# Patient Record
Sex: FEMALE | Race: AMERICAN INDIAN OR ALASKA NATIVE
[De-identification: names, ages, dates, MRNs, and addresses within clinical notes are randomized per-mention and may not be internally consistent; named-entity substitution may affect disease eponyms.]

---

## 2018-10-05 ENCOUNTER — HOSPITAL ENCOUNTER (OUTPATIENT)
Dept: HOSPITAL 5 - MRI | Age: 43
Discharge: HOME | End: 2018-10-05
Attending: OTOLARYNGOLOGY
Payer: COMMERCIAL

## 2018-10-05 DIAGNOSIS — G43.009: Primary | ICD-10-CM

## 2018-10-05 PROCEDURE — 70544 MR ANGIOGRAPHY HEAD W/O DYE: CPT

## 2018-10-05 NOTE — MAGNETIC RESONANCE REPORT
MRA HEAD WITHOUT CONTRAST: 10/05/18 06:48:00



CLINICAL: Migraine headache without aura.  No comparisons.



TECHNIQUE: Axial 3-D time-of-flight MR angiography of the [place] 

[place] with review of axial source images.



FINDINGS: Intact [place] with no aneurysm, stenosis or 

occlusion.  Symmetric blood flow in the anterior, middle and posterior 

cerebral arteries.  Normal basilar and vertebral arteries.  The left 

vertebral artery is dominant.  The



IMPRESSION: Normal study.